# Patient Record
Sex: FEMALE | Race: WHITE | NOT HISPANIC OR LATINO
[De-identification: names, ages, dates, MRNs, and addresses within clinical notes are randomized per-mention and may not be internally consistent; named-entity substitution may affect disease eponyms.]

---

## 2017-06-07 ENCOUNTER — APPOINTMENT (OUTPATIENT)
Dept: GASTROENTEROLOGY | Facility: CLINIC | Age: 62
End: 2017-06-07

## 2017-06-07 VITALS
TEMPERATURE: 98.2 F | SYSTOLIC BLOOD PRESSURE: 120 MMHG | DIASTOLIC BLOOD PRESSURE: 64 MMHG | HEIGHT: 65 IN | BODY MASS INDEX: 16.66 KG/M2 | WEIGHT: 100 LBS

## 2017-06-07 DIAGNOSIS — Z78.9 OTHER SPECIFIED HEALTH STATUS: ICD-10-CM

## 2017-06-07 PROBLEM — Z00.00 ENCOUNTER FOR PREVENTIVE HEALTH EXAMINATION: Status: ACTIVE | Noted: 2017-06-07

## 2017-06-07 RX ORDER — FUROSEMIDE 20 MG/1
20 TABLET ORAL
Refills: 0 | Status: ACTIVE | COMMUNITY

## 2017-06-07 RX ORDER — LORATADINE 10 MG
17 TABLET,DISINTEGRATING ORAL
Refills: 0 | Status: ACTIVE | COMMUNITY

## 2017-12-20 ENCOUNTER — APPOINTMENT (OUTPATIENT)
Dept: GASTROENTEROLOGY | Facility: CLINIC | Age: 62
End: 2017-12-20
Payer: COMMERCIAL

## 2017-12-20 VITALS
HEIGHT: 65 IN | BODY MASS INDEX: 16.5 KG/M2 | TEMPERATURE: 98.5 F | DIASTOLIC BLOOD PRESSURE: 70 MMHG | WEIGHT: 99 LBS | SYSTOLIC BLOOD PRESSURE: 130 MMHG

## 2017-12-20 DIAGNOSIS — Z82.62 FAMILY HISTORY OF OSTEOPOROSIS: ICD-10-CM

## 2017-12-20 DIAGNOSIS — Z82.49 FAMILY HISTORY OF ISCHEMIC HEART DISEASE AND OTHER DISEASES OF THE CIRCULATORY SYSTEM: ICD-10-CM

## 2017-12-20 PROCEDURE — 99213 OFFICE O/P EST LOW 20 MIN: CPT

## 2018-01-31 ENCOUNTER — CHART COPY (OUTPATIENT)
Age: 63
End: 2018-01-31

## 2018-02-07 ENCOUNTER — APPOINTMENT (OUTPATIENT)
Dept: GASTROENTEROLOGY | Facility: CLINIC | Age: 63
End: 2018-02-07
Payer: COMMERCIAL

## 2018-02-07 VITALS
TEMPERATURE: 98.1 F | DIASTOLIC BLOOD PRESSURE: 80 MMHG | BODY MASS INDEX: 16.33 KG/M2 | SYSTOLIC BLOOD PRESSURE: 122 MMHG | WEIGHT: 98 LBS | HEIGHT: 65 IN

## 2018-02-07 DIAGNOSIS — K62.5 HEMORRHAGE OF ANUS AND RECTUM: ICD-10-CM

## 2018-02-07 PROCEDURE — 99214 OFFICE O/P EST MOD 30 MIN: CPT

## 2018-02-07 RX ORDER — OLOPATADINE HCL 1 MG/ML
0.1 SOLUTION/ DROPS OPHTHALMIC
Qty: 5 | Refills: 0 | Status: ACTIVE | COMMUNITY
Start: 2017-07-26

## 2018-04-23 ENCOUNTER — APPOINTMENT (OUTPATIENT)
Dept: GASTROENTEROLOGY | Facility: CLINIC | Age: 63
End: 2018-04-23

## 2018-05-02 ENCOUNTER — APPOINTMENT (OUTPATIENT)
Dept: GASTROENTEROLOGY | Facility: CLINIC | Age: 63
End: 2018-05-02
Payer: COMMERCIAL

## 2018-05-02 VITALS
DIASTOLIC BLOOD PRESSURE: 80 MMHG | OXYGEN SATURATION: 98 % | HEIGHT: 65 IN | SYSTOLIC BLOOD PRESSURE: 120 MMHG | BODY MASS INDEX: 16.33 KG/M2 | TEMPERATURE: 97.7 F | WEIGHT: 98 LBS | HEART RATE: 68 BPM

## 2018-05-02 DIAGNOSIS — Z83.3 FAMILY HISTORY OF DIABETES MELLITUS: ICD-10-CM

## 2018-05-02 DIAGNOSIS — Z82.49 FAMILY HISTORY OF ISCHEMIC HEART DISEASE AND OTHER DISEASES OF THE CIRCULATORY SYSTEM: ICD-10-CM

## 2018-05-02 PROCEDURE — 99213 OFFICE O/P EST LOW 20 MIN: CPT

## 2018-05-02 RX ORDER — MELOXICAM 7.5 MG/1
7.5 TABLET ORAL
Qty: 60 | Refills: 0 | Status: ACTIVE | COMMUNITY
Start: 2017-11-30

## 2018-10-10 ENCOUNTER — APPOINTMENT (OUTPATIENT)
Dept: GASTROENTEROLOGY | Facility: CLINIC | Age: 63
End: 2018-10-10
Payer: COMMERCIAL

## 2018-10-10 VITALS
WEIGHT: 97 LBS | BODY MASS INDEX: 16.16 KG/M2 | SYSTOLIC BLOOD PRESSURE: 134 MMHG | TEMPERATURE: 98.2 F | HEART RATE: 72 BPM | HEIGHT: 65 IN | OXYGEN SATURATION: 98 % | DIASTOLIC BLOOD PRESSURE: 76 MMHG

## 2018-10-10 PROCEDURE — 99213 OFFICE O/P EST LOW 20 MIN: CPT

## 2018-10-10 RX ORDER — METHYLPREDNISOLONE 4 MG/1
4 TABLET ORAL
Qty: 21 | Refills: 0 | Status: DISCONTINUED | COMMUNITY
Start: 2018-05-14

## 2018-10-10 RX ORDER — CYCLOBENZAPRINE HYDROCHLORIDE 10 MG/1
10 TABLET, FILM COATED ORAL
Qty: 30 | Refills: 0 | Status: DISCONTINUED | COMMUNITY
Start: 2017-11-30 | End: 2018-10-10

## 2018-10-10 RX ORDER — LIDOCAINE 5% 700 MG/1
5 PATCH TOPICAL
Qty: 30 | Refills: 0 | Status: ACTIVE | COMMUNITY
Start: 2018-06-07

## 2019-02-20 ENCOUNTER — APPOINTMENT (OUTPATIENT)
Dept: GASTROENTEROLOGY | Facility: CLINIC | Age: 64
End: 2019-02-20
Payer: MEDICARE

## 2019-02-20 VITALS
DIASTOLIC BLOOD PRESSURE: 80 MMHG | WEIGHT: 102 LBS | OXYGEN SATURATION: 98 % | TEMPERATURE: 98.1 F | HEIGHT: 65 IN | HEART RATE: 74 BPM | BODY MASS INDEX: 17 KG/M2 | SYSTOLIC BLOOD PRESSURE: 140 MMHG

## 2019-02-20 DIAGNOSIS — E53.8 DEFICIENCY OF OTHER SPECIFIED B GROUP VITAMINS: ICD-10-CM

## 2019-02-20 PROCEDURE — 99213 OFFICE O/P EST LOW 20 MIN: CPT

## 2019-02-20 RX ORDER — CLOBETASOL PROPIONATE 0.5 MG/G
0.05 OINTMENT TOPICAL
Qty: 15 | Refills: 0 | Status: DISCONTINUED | COMMUNITY
Start: 2018-07-03 | End: 2019-02-20

## 2019-02-20 RX ORDER — CYCLOBENZAPRINE HYDROCHLORIDE 5 MG/1
5 TABLET, FILM COATED ORAL
Qty: 60 | Refills: 0 | Status: DISCONTINUED | COMMUNITY
Start: 2018-06-07 | End: 2019-02-20

## 2019-02-20 NOTE — PHYSICAL EXAM
[General Appearance - Alert] : alert [General Appearance - In No Acute Distress] : in no acute distress [Sclera] : the sclera and conjunctiva were normal [Neck Appearance] : the appearance of the neck was normal [Auscultation Breath Sounds / Voice Sounds] : lungs were clear to auscultation bilaterally [Heart Rate And Rhythm] : heart rate was normal and rhythm regular [Heart Sounds] : normal S1 and S2 [Murmurs] : no murmurs [Abdominal Aorta] : the abdominal aorta was normal [Edema] : there was no peripheral edema [Bowel Sounds] : normal bowel sounds [Abdomen Soft] : soft [Abdomen Tenderness] : non-tender [] : no hepato-splenomegaly [Abdomen Mass (___ Cm)] : no abdominal mass palpated [No Rectal Mass] : no rectal mass [FreeTextEntry1] : Small external hemorrhoids [Abnormal Walk] : normal gait [Oriented To Time, Place, And Person] : oriented to person, place, and time [Impaired Insight] : insight and judgment were intact

## 2019-02-20 NOTE — REASON FOR VISIT
[Follow-Up: _____] : a [unfilled] follow-up visit [Spouse] : spouse [FreeTextEntry1] : Irritable bowel syndrome

## 2019-02-20 NOTE — HISTORY OF PRESENT ILLNESS
[FreeTextEntry1] : Patient continue office for evaluation of irritable bowel syndrome. She states that she has been doing well using MiraLax 3-4 times daily for constipation and Levsin sublingual p.r.n. She was recently diagnosed with folic acid deficiency. She has been started on folic acid 1 mg daily. The patient has been checked for celiac and passed.\par \par The patient otherwise feels well her weight has been stable there is no nausea no vomiting and she uses ranitidine 1-2 times daily as needed.

## 2019-02-20 NOTE — REVIEW OF SYSTEMS
[Fever] : no fever [Chills] : no chills [Abdominal Pain] : no abdominal pain [Vomiting] : no vomiting [Constipation] : constipation [Heartburn] : heartburn [Negative] : Neurological [FreeTextEntry2] : /75 both arms

## 2019-02-20 NOTE — ASSESSMENT
[FreeTextEntry1] : Clinically the patient is doing well at the present time. She was diagnosed with folic acid deficiency. In view of this will reevaluate patient for celiac disease. The meantime patient was advised to continue ranitidine p.r.n., cortisone cream p.r.n. for hemorrhoids and perianal discomfort, and Levsin p.r.n.\par \par Return 6 months

## 2019-02-21 LAB — IGA SER QL IEP: 203 MG/DL

## 2019-02-23 LAB
ENDOMYSIUM IGA SER QL: NEGATIVE
ENDOMYSIUM IGA TITR SER: NORMAL
GLIADIN IGA SER QL: 48 UNITS
GLIADIN IGG SER QL: <5 UNITS
GLIADIN PEPTIDE IGA SER-ACNC: POSITIVE
GLIADIN PEPTIDE IGG SER-ACNC: NEGATIVE
TTG IGA SER IA-ACNC: <1.2 U/ML
TTG IGA SER-ACNC: NEGATIVE
TTG IGG SER IA-ACNC: 1.4 U/ML
TTG IGG SER IA-ACNC: NEGATIVE

## 2019-03-19 ENCOUNTER — APPOINTMENT (OUTPATIENT)
Dept: GASTROENTEROLOGY | Facility: AMBULATORY MEDICAL SERVICES | Age: 64
End: 2019-03-19
Payer: MEDICARE

## 2019-03-19 PROCEDURE — 43239 EGD BIOPSY SINGLE/MULTIPLE: CPT

## 2019-09-25 ENCOUNTER — APPOINTMENT (OUTPATIENT)
Dept: GASTROENTEROLOGY | Facility: CLINIC | Age: 64
End: 2019-09-25
Payer: MEDICARE

## 2019-09-25 VITALS
HEART RATE: 81 BPM | SYSTOLIC BLOOD PRESSURE: 160 MMHG | HEIGHT: 64 IN | BODY MASS INDEX: 16.56 KG/M2 | TEMPERATURE: 97.5 F | OXYGEN SATURATION: 98 % | DIASTOLIC BLOOD PRESSURE: 70 MMHG | WEIGHT: 97 LBS

## 2019-09-25 DIAGNOSIS — R12 HEARTBURN: ICD-10-CM

## 2019-09-25 DIAGNOSIS — Z86.79 PERSONAL HISTORY OF OTHER DISEASES OF THE CIRCULATORY SYSTEM: ICD-10-CM

## 2019-09-25 DIAGNOSIS — Z87.19 PERSONAL HISTORY OF OTHER DISEASES OF THE DIGESTIVE SYSTEM: ICD-10-CM

## 2019-09-25 DIAGNOSIS — K58.9 IRRITABLE BOWEL SYNDROME W/OUT DIARRHEA: ICD-10-CM

## 2019-09-25 PROCEDURE — 99214 OFFICE O/P EST MOD 30 MIN: CPT

## 2019-09-25 RX ORDER — HYDROCORTISONE 25 MG/G
2.5 CREAM TOPICAL TWICE DAILY
Qty: 30 | Refills: 1 | Status: DISCONTINUED | COMMUNITY
Start: 2017-12-20 | End: 2019-09-25

## 2019-09-25 RX ORDER — HYOSCYAMINE SULFATE 0.12 MG/1
0.12 TABLET SUBLINGUAL 3 TIMES DAILY
Qty: 270 | Refills: 1 | Status: DISCONTINUED | COMMUNITY
Start: 2018-10-10 | End: 2019-09-25

## 2019-09-25 RX ORDER — SODIUM FLUORIDE AND POTASSIUM NITRATE 5.8; 57.5 MG/ML; MG/ML
1.1-5 GEL, DENTIFRICE DENTAL
Qty: 100 | Refills: 0 | Status: DISCONTINUED | COMMUNITY
Start: 2018-02-20 | End: 2019-09-25

## 2019-09-25 RX ORDER — HYDROCORTISONE 25 MG/G
2.5 CREAM TOPICAL DAILY
Qty: 30 | Refills: 3 | Status: DISCONTINUED | COMMUNITY
Start: 2017-06-07 | End: 2019-09-25

## 2019-09-25 RX ORDER — HYOSCYAMINE SULFATE 0.12 MG/1
0.12 TABLET SUBLINGUAL 3 TIMES DAILY
Qty: 270 | Refills: 3 | Status: DISCONTINUED | COMMUNITY
Start: 2017-12-20 | End: 2019-09-25

## 2019-09-25 RX ORDER — HYDROCORTISONE 2.5% 25 MG/G
2.5 CREAM TOPICAL DAILY
Refills: 0 | Status: DISCONTINUED | COMMUNITY
End: 2019-09-25

## 2019-09-25 RX ORDER — HYDROCORTISONE 2.5% 25 MG/G
2.5 CREAM TOPICAL
Qty: 30 | Refills: 0 | Status: DISCONTINUED | COMMUNITY
Start: 2018-05-02 | End: 2019-09-25

## 2019-09-25 RX ORDER — DICLOFENAC SODIUM 10 MG/G
1 GEL TOPICAL
Qty: 100 | Refills: 0 | Status: DISCONTINUED | COMMUNITY
Start: 2017-12-07 | End: 2019-09-25

## 2019-09-25 RX ORDER — RANITIDINE 150 MG/1
150 TABLET ORAL
Qty: 180 | Refills: 2 | Status: DISCONTINUED | COMMUNITY
Start: 2018-05-02 | End: 2019-09-25

## 2019-09-25 RX ORDER — FUROSEMIDE 40 MG/1
40 TABLET ORAL
Qty: 30 | Refills: 0 | Status: DISCONTINUED | COMMUNITY
Start: 2018-06-21 | End: 2019-09-25

## 2019-09-25 RX ORDER — RANITIDINE 150 MG/1
150 TABLET ORAL
Qty: 180 | Refills: 3 | Status: DISCONTINUED | COMMUNITY
Start: 2017-12-20 | End: 2019-09-25

## 2019-09-25 RX ORDER — HYOSCYAMINE SULFATE 0.12 MG/1
0.12 TABLET SUBLINGUAL 3 TIMES DAILY
Qty: 270 | Refills: 3 | Status: DISCONTINUED | COMMUNITY
Start: 2018-05-02 | End: 2019-09-25

## 2019-09-25 RX ORDER — RANITIDINE 150 MG/1
150 TABLET ORAL
Qty: 180 | Refills: 1 | Status: DISCONTINUED | COMMUNITY
Start: 2018-10-10 | End: 2019-09-25

## 2019-09-25 RX ORDER — GABAPENTIN 100 MG/1
100 CAPSULE ORAL
Qty: 240 | Refills: 0 | Status: DISCONTINUED | COMMUNITY
Start: 2017-11-30 | End: 2019-09-25

## 2019-09-25 RX ORDER — RANITIDINE 150 MG/1
150 TABLET ORAL
Refills: 0 | Status: DISCONTINUED | COMMUNITY
End: 2019-09-25

## 2019-09-25 RX ORDER — HYOSCYAMINE SULFATE 0.12 MG/1
0.12 TABLET, ORALLY DISINTEGRATING ORAL
Refills: 0 | Status: DISCONTINUED | COMMUNITY
End: 2019-09-25

## 2019-09-25 RX ORDER — HYOSCYAMINE SULFATE 0.12 MG/1
0.12 TABLET SUBLINGUAL 4 TIMES DAILY
Qty: 100 | Refills: 3 | Status: DISCONTINUED | COMMUNITY
Start: 2017-06-07 | End: 2019-09-25

## 2019-09-25 RX ORDER — HYDROCORTISONE 2.5% 25 MG/G
2.5 CREAM TOPICAL DAILY
Qty: 1 | Refills: 0 | Status: DISCONTINUED | COMMUNITY
Start: 2018-10-10 | End: 2019-09-25

## 2019-09-25 RX ORDER — RANITIDINE 150 MG/1
150 TABLET ORAL
Qty: 180 | Refills: 3 | Status: DISCONTINUED | COMMUNITY
Start: 2017-06-07 | End: 2019-09-25

## 2019-09-25 RX ORDER — HYOSCYAMINE SULFATE 0.12 MG/1
0.12 TABLET, ORALLY DISINTEGRATING ORAL 3 TIMES DAILY
Qty: 180 | Refills: 2 | Status: DISCONTINUED | COMMUNITY
Start: 2019-02-20 | End: 2019-09-25

## 2019-09-25 NOTE — ASSESSMENT
[FreeTextEntry1] : Patient with IBS for which she takes Levsin approximately every 2 months.  She has constipation that is mostly controlled with MiraLAX 3 times a week.  She has a history of colonic polyps and a squamous papilloma of the esophagus.  The patient gets occasional heartburn which is relieved by ranitidine.\par \par Patient will continue her current treatment.  Given the recent problems with ranitidine, this was changed to famotidine 40 mg which she can take 1-2 times a day.\par \par Patient will return to see me in 6 months.  She is due for colonoscopy in March 2020 and we will perform an EGD at the same time to follow-up the papilloma.

## 2019-09-25 NOTE — HISTORY OF PRESENT ILLNESS
[FreeTextEntry1] : The patient was a patient of Dr. Webber, who has retired.  The patient is now seeing me to transfer care.\par \par The patient has IBS and a history of colonic polyps.  She had an EGD on March 19, 2019 at which time a squamous papilloma was removed from the esophagus.  The patient gets occasional heartburn for which she takes ranitidine 150 mg a day and occasional Tums.  She denies dysphasia.  She does have occasional epigastric pain.  She takes MiraLAX 3 times a week and tends to move her bowels once every other day.  She denies melena or bright red blood per rectum.  The patient's weight is stable.  She notes that she gets feelings of fecal urgency approximately every 2 months which is relieved by Levsin.  The patient has not been hospitalized in the past year and denies any cardiac issues.

## 2019-09-25 NOTE — CONSULT LETTER
[FreeTextEntry1] : Dear Dr. Alondra Wilburn,\par \par I had the pleasure of seeing your patient ARNOLDO LARA in the office today.  My office note is attached.\par \par Thank you very much for allowing me to participate in the care of your patient.\par \par Sincerely,\par \par Cristiano Quesada M.D., FAC, FACP\par Director, Celiac Program at [facility]\par  of Medicine\par [place] [facility] [employer] at [facility] Practice Director,\par [facility] - Gastroenterology/Internal Medicine at [place]\[facility] [address]\[place] [address]\par Tel: [phone]\par Email: [email or web address]\par \par \par The attached note has been created using a voice recognition system (Dragon).  There may be some misspellings and typos.  Please call my office if you have any issues or questions.

## 2019-09-25 NOTE — PHYSICAL EXAM
[General Appearance - Alert] : alert [General Appearance - In No Acute Distress] : in no acute distress [Neck Appearance] : the appearance of the neck was normal [Neck Cervical Mass (___cm)] : no neck mass was observed [Jugular Venous Distention Increased] : there was no jugular-venous distention [Thyroid Diffuse Enlargement] : the thyroid was not enlarged [Thyroid Nodule] : there were no palpable thyroid nodules [Auscultation Breath Sounds / Voice Sounds] : lungs were clear to auscultation bilaterally [Heart Rate And Rhythm] : heart rate was normal and rhythm regular [Heart Sounds] : normal S1 and S2 [Heart Sounds Gallop] : no gallops [Murmurs] : no murmurs [Heart Sounds Pericardial Friction Rub] : no pericardial rub [Edema] : there was no peripheral edema [Bowel Sounds] : normal bowel sounds [Abdomen Tenderness] : non-tender [Abdomen Soft] : soft [] : no hepato-splenomegaly [Abdomen Mass (___ Cm)] : no abdominal mass palpated [No CVA Tenderness] : no ~M costovertebral angle tenderness [No Spinal Tenderness] : no spinal tenderness [Oriented To Time, Place, And Person] : oriented to person, place, and time [Impaired Insight] : insight and judgment were intact [Affect] : the affect was normal

## 2020-02-25 ENCOUNTER — APPOINTMENT (OUTPATIENT)
Dept: GASTROENTEROLOGY | Facility: CLINIC | Age: 65
End: 2020-02-25
Payer: MEDICARE

## 2020-02-25 VITALS
TEMPERATURE: 98.5 F | HEART RATE: 94 BPM | OXYGEN SATURATION: 98 % | SYSTOLIC BLOOD PRESSURE: 122 MMHG | WEIGHT: 98 LBS | HEIGHT: 64 IN | DIASTOLIC BLOOD PRESSURE: 80 MMHG | BODY MASS INDEX: 16.73 KG/M2

## 2020-02-25 DIAGNOSIS — D36.9 BENIGN NEOPLASM, UNSPECIFIED SITE: ICD-10-CM

## 2020-02-25 PROCEDURE — 99213 OFFICE O/P EST LOW 20 MIN: CPT

## 2020-02-25 RX ORDER — RANITIDINE 150 MG/1
150 TABLET ORAL
Qty: 180 | Refills: 1 | Status: DISCONTINUED | COMMUNITY
Start: 2019-02-20 | End: 2020-02-25

## 2020-02-25 NOTE — PHYSICAL EXAM
[General Appearance - Alert] : alert [General Appearance - In No Acute Distress] : in no acute distress [Neck Appearance] : the appearance of the neck was normal [Jugular Venous Distention Increased] : there was no jugular-venous distention [Thyroid Diffuse Enlargement] : the thyroid was not enlarged [Neck Cervical Mass (___cm)] : no neck mass was observed [Thyroid Nodule] : there were no palpable thyroid nodules [Auscultation Breath Sounds / Voice Sounds] : lungs were clear to auscultation bilaterally [Heart Rate And Rhythm] : heart rate was normal and rhythm regular [Heart Sounds Gallop] : no gallops [Heart Sounds] : normal S1 and S2 [Heart Sounds Pericardial Friction Rub] : no pericardial rub [Murmurs] : no murmurs [Edema] : there was no peripheral edema [Abdomen Soft] : soft [Bowel Sounds] : normal bowel sounds [Abdomen Tenderness] : non-tender [] : no hepato-splenomegaly [No CVA Tenderness] : no ~M costovertebral angle tenderness [Abdomen Mass (___ Cm)] : no abdominal mass palpated [No Spinal Tenderness] : no spinal tenderness [Impaired Insight] : insight and judgment were intact [Oriented To Time, Place, And Person] : oriented to person, place, and time [Affect] : the affect was normal

## 2020-02-27 NOTE — CONSULT LETTER
[FreeTextEntry1] : Dear Dr. Alondra Wilburn,\[facility] \[facility] I had the pleasure of seeing your patient ARNOLDO LARA in the office today.  My office note is attached. PLEASE READ THE "ASSESSMENT" SECTION OF THE NOTE TO SEE MY IMPRESSION AND PLAN.\par \[facility] Thank you very much for allowing me to participate in the care of your patient.\[facility] \[facility] Sincerely,\[facility] \[facility] Cristiano Quesada M.D., FAC, [facility] Director, Celiac Program at [facility]  of Medicine\[facility] and [employer] at [facility] Practice Director,\[facility] - Gastroenterology/Internal Medicine at [address]\[place], [place] [address] Tel: [phone]\[facility] Email: [email or web address] \[facility] \[facility] The attached note has been created using a voice recognition system (Dragon).  There may be some misspellings and typos.  Please call my office if you have any issues or questions.

## 2020-02-27 NOTE — HISTORY OF PRESENT ILLNESS
[FreeTextEntry1] : The patient has a history of IBS with constipation, reflux, history of colonic polyps, and a history of a squamous papilloma of the esophagus.  She gets occasional attacks of abdominal pain the last one 6 to 8 weeks ago.  She uses Levsin as needed.  She uses MiraLAX 3-4 times a week and has a bowel movement every other day.  She denies melena but does see bright red blood on the toilet paper on occasion.  The patient is on Pepcid daily and takes Tums about once a week for occasional heartburn.  She denies dysphasia.  The patient's weight is stable.  The patient has not been admitted to the hospital in the past year and denies any cardiac issues.\par \par \par Note from 9/25/2019 - The patient was a patient of Dr. Webber, who has retired.  The patient is now seeing me to transfer care.\par \par The patient has IBS and a history of colonic polyps.  She had an EGD on March 19, 2019 at which time a squamous papilloma was removed from the esophagus.  The patient gets occasional heartburn for which she takes ranitidine 150 mg a day and occasional Tums.  She denies dysphasia.  She does have occasional epigastric pain.  She takes MiraLAX 3 times a week and tends to move her bowels once every other day.  She denies melena or bright red blood per rectum.  The patient's weight is stable.  She notes that she gets feelings of fecal urgency approximately every 2 months which is relieved by Levsin.  The patient has not been hospitalized in the past year and denies any cardiac issues.

## 2020-02-27 NOTE — REASON FOR VISIT
[FreeTextEntry1] : IBS, history of colonic polyps, reflux, history of squamous papilloma of the esophagus

## 2020-04-02 ENCOUNTER — APPOINTMENT (OUTPATIENT)
Dept: GASTROENTEROLOGY | Facility: AMBULATORY MEDICAL SERVICES | Age: 65
End: 2020-04-02

## 2021-10-20 ENCOUNTER — APPOINTMENT (OUTPATIENT)
Dept: GASTROENTEROLOGY | Facility: CLINIC | Age: 66
End: 2021-10-20
Payer: MEDICARE

## 2021-10-20 VITALS
DIASTOLIC BLOOD PRESSURE: 80 MMHG | BODY MASS INDEX: 16.3 KG/M2 | OXYGEN SATURATION: 98 % | TEMPERATURE: 96.9 F | WEIGHT: 95.5 LBS | HEART RATE: 81 BPM | SYSTOLIC BLOOD PRESSURE: 120 MMHG | HEIGHT: 64 IN

## 2021-10-20 PROCEDURE — 36415 COLL VENOUS BLD VENIPUNCTURE: CPT

## 2021-10-20 PROCEDURE — 99214 OFFICE O/P EST MOD 30 MIN: CPT | Mod: 25

## 2021-10-20 RX ORDER — SODIUM SULFATE, POTASSIUM SULFATE, MAGNESIUM SULFATE 17.5; 3.13; 1.6 G/ML; G/ML; G/ML
17.5-3.13-1.6 SOLUTION, CONCENTRATE ORAL
Qty: 1 | Refills: 0 | Status: ACTIVE | COMMUNITY
Start: 2020-02-25 | End: 1900-01-01

## 2021-10-20 NOTE — HISTORY OF PRESENT ILLNESS
[FreeTextEntry1] : The patient has IBS and reflux as well as a history of adenomatous colonic polyps.  She suffers from both constipation and diarrhea.  She is primarily constipated and uses MiraLAX 3 times a week.  With that, she still needs to use milk of magnesia at times and moves her bowels every 3 days or even less often.  She then will have attacks of abdominal cramping in the upper abdomen along with bouts of diarrhea.  These have increased in frequency and occur approximately every 2 weeks.  They usually last a few hours and are improved with the use of hyoscyamine but the last attack lasted a week.  The patient also takes famotidine 40 mg 1-2 times a day.  With that, she still gets heartburn 2-3 times a week.  She denies dysphagia and denies melena or bright red blood per rectum.  Her weight has been around 100 pounds for sometimes although she has had some mild weight loss recently.  The patient has not been admitted to the hospital in the past year and denies any cardiac issues.  The patient did not have her previously recommended EGD and colonoscopy due to the pandemic.\par \par \par Note from 2/25/2020 - The patient has a history of IBS with constipation, reflux, history of colonic polyps, and a history of a squamous papilloma of the esophagus.  She gets occasional attacks of abdominal pain the last one 6 to 8 weeks ago.  She uses Levsin as needed.  She uses MiraLAX 3-4 times a week and has a bowel movement every other day.  She denies melena but does see bright red blood on the toilet paper on occasion.  The patient is on Pepcid daily and takes Tums about once a week for occasional heartburn.  She denies dysphasia.  The patient's weight is stable.  The patient has not been admitted to the hospital in the past year and denies any cardiac issues.\par \par \par Note from 9/25/2019 - The patient was a patient of Dr. Webber, who has retired.  The patient is now seeing me to transfer care.\par \par The patient has IBS and a history of colonic polyps.  She had an EGD on March 19, 2019 at which time a squamous papilloma was removed from the esophagus.  The patient gets occasional heartburn for which she takes ranitidine 150 mg a day and occasional Tums.  She denies dysphasia.  She does have occasional epigastric pain.  She takes MiraLAX 3 times a week and tends to move her bowels once every other day.  She denies melena or bright red blood per rectum.  The patient's weight is stable.  She notes that she gets feelings of fecal urgency approximately every 2 months which is relieved by Levsin.  The patient has not been hospitalized in the past year and denies any cardiac issues.

## 2021-10-20 NOTE — ASSESSMENT
[FreeTextEntry1] : Patient with IBS, history of adenomatous colonic polyps, and reflux.  She has primary constipation but has bouts of abdominal cramping and diarrhea.\par \par Patient was advised to take MiraLAX daily in order to move her bowels more regularly and prevent attacks.  She will continue to use famotidine 1-2 times a day and hyoscyamine as necessary.\par \par Blood work was sent for celiac labs and genetic testing as well as TSH.\par \par An EGD and colonoscopy have been scheduled. The risks, benefits, alternatives, and limitations of the procedures, including the possibility of missed lesions, were explained.\par \par \par Plan from 2/25/2020 - Patient with IBS and constipation, reflux, history of colonic polyps, and a history of squamous papilloma of the esophagus.  She is doing well on Levsin as needed, MiraLAX 3-4 times a week, and Pepcid daily.\par \par An EGD and colonoscopy have been scheduled. The risks, benefits, alternatives, and limitations of the procedures, including the possibility of missed lesions, were explained.\par \par Patient will continue her current medications.\par \par \par Plan from 9/25/2019 - Patient with IBS for which she takes Levsin approximately every 2 months.  She has constipation that is mostly controlled with MiraLAX 3 times a week.  She has a history of colonic polyps and a squamous papilloma of the esophagus.  The patient gets occasional heartburn which is relieved by ranitidine.\par \par Patient will continue her current treatment.  Given the recent problems with ranitidine, this was changed to famotidine 40 mg which she can take 1-2 times a day.\par \par Patient will return to see me in 6 months.  She is due for colonoscopy in March 2020 and we will perform an EGD at the same time to follow-up the papilloma.

## 2021-10-20 NOTE — REASON FOR VISIT
[Spouse] : spouse [FreeTextEntry1] : IBS, history of adenomatous colonic polyps, abdominal cramping, irregular bowel habits, reflux

## 2021-10-26 LAB
ENDOMYSIUM IGA SER QL: NEGATIVE
ENDOMYSIUM IGA TITR SER: NORMAL
GLIADIN IGA SER QL: 18.8 UNITS
GLIADIN IGG SER QL: <5 UNITS
GLIADIN PEPTIDE IGA SER-ACNC: NEGATIVE
GLIADIN PEPTIDE IGG SER-ACNC: NEGATIVE
IGA SER QL IEP: 226 MG/DL
PROMETHEUS CELIAC GENETICS: NORMAL
TSH SERPL-ACNC: 2.01 UIU/ML
TTG IGA SER IA-ACNC: <1.2 U/ML
TTG IGA SER-ACNC: NEGATIVE
TTG IGG SER IA-ACNC: 1.2 U/ML
TTG IGG SER IA-ACNC: NEGATIVE

## 2021-10-27 ENCOUNTER — NON-APPOINTMENT (OUTPATIENT)
Age: 66
End: 2021-10-27

## 2021-11-16 DIAGNOSIS — Z01.818 ENCOUNTER FOR OTHER PREPROCEDURAL EXAMINATION: ICD-10-CM

## 2021-11-16 DIAGNOSIS — Z20.822 CONTACT WITH AND (SUSPECTED) EXPOSURE TO COVID-19: ICD-10-CM

## 2021-11-17 ENCOUNTER — APPOINTMENT (OUTPATIENT)
Dept: DISASTER EMERGENCY | Facility: CLINIC | Age: 66
End: 2021-11-17

## 2021-11-18 LAB — SARS-COV-2 N GENE NPH QL NAA+PROBE: NOT DETECTED

## 2021-11-22 ENCOUNTER — APPOINTMENT (OUTPATIENT)
Dept: GASTROENTEROLOGY | Facility: AMBULATORY MEDICAL SERVICES | Age: 66
End: 2021-11-22
Payer: MEDICARE

## 2021-11-22 PROCEDURE — 45380 COLONOSCOPY AND BIOPSY: CPT | Mod: 59

## 2021-11-22 PROCEDURE — 45385 COLONOSCOPY W/LESION REMOVAL: CPT | Mod: PT

## 2021-11-22 PROCEDURE — 43239 EGD BIOPSY SINGLE/MULTIPLE: CPT

## 2021-11-23 ENCOUNTER — NON-APPOINTMENT (OUTPATIENT)
Age: 66
End: 2021-11-23

## 2021-12-07 ENCOUNTER — APPOINTMENT (OUTPATIENT)
Dept: GASTROENTEROLOGY | Facility: CLINIC | Age: 66
End: 2021-12-07
Payer: MEDICARE

## 2021-12-07 DIAGNOSIS — K31.7 POLYP OF STOMACH AND DUODENUM: ICD-10-CM

## 2021-12-07 DIAGNOSIS — D12.6 BENIGN NEOPLASM OF COLON, UNSPECIFIED: ICD-10-CM

## 2021-12-07 DIAGNOSIS — K57.30 DIVERTICULOSIS OF LARGE INTESTINE W/OUT PERFORATION OR ABSCESS W/OUT BLEEDING: ICD-10-CM

## 2021-12-07 DIAGNOSIS — K44.9 DIAPHRAGMATIC HERNIA W/OUT OBSTRUCTION OR GANGRENE: ICD-10-CM

## 2021-12-07 DIAGNOSIS — K64.9 UNSPECIFIED HEMORRHOIDS: ICD-10-CM

## 2021-12-07 DIAGNOSIS — K64.8 OTHER HEMORRHOIDS: ICD-10-CM

## 2021-12-07 DIAGNOSIS — K64.4 RESIDUAL HEMORRHOIDAL SKIN TAGS: ICD-10-CM

## 2021-12-07 DIAGNOSIS — K21.9 GASTRO-ESOPHAGEAL REFLUX DISEASE W/OUT ESOPHAGITIS: ICD-10-CM

## 2021-12-07 PROCEDURE — 99213 OFFICE O/P EST LOW 20 MIN: CPT | Mod: 95

## 2021-12-08 PROBLEM — K44.9 HIATAL HERNIA: Status: ACTIVE | Noted: 2021-12-08

## 2021-12-08 NOTE — REASON FOR VISIT
[FreeTextEntry1] : Hiatal hernia, gastric polyps, adenomatous colonic polyps, diverticulosis, hemorrhoids, IBS, reflux

## 2021-12-08 NOTE — HISTORY OF PRESENT ILLNESS
[Home] : at home, [unfilled] , at the time of the visit. [Medical Office: ([facility])___] : at the medical office located in  [Verbal consent obtained from patient] : the patient, [unfilled] [FreeTextEntry1] : We reviewed the evaluations done since the patient's last visit on October 20, 2021.  Blood work revealed negative celiac markers, negative celiac genetic testing, and normal thyroid test.  She underwent EGD and colonoscopy on November 22, 2021.  EGD was significant for a 1 cm hiatal hernia along with benign fundic gland polyps in the gastric body.  Colonoscopy was significant for removal of 2 tubular adenomatous polyps from the cecum along with moderate sigmoid diverticulosis.  She has internal and external hemorrhoids which sometimes cause pain and burning but is relieved by use of Proctozone 2.5% cream.  The patient takes famotidine 1-2 times a day for her reflux and notes that her heartburn is under control with this treatment.  She is on MiraLAX, although not daily, and is moving her bowels every few days.  She has had infrequent need for hyoscyamine as she has not been suffering from her IBS.\par \par \par Note from 10/20/2021 - The patient has IBS and reflux as well as a history of adenomatous colonic polyps.  She suffers from both constipation and diarrhea.  She is primarily constipated and uses MiraLAX 3 times a week.  With that, she still needs to use milk of magnesia at times and moves her bowels every 3 days or even less often.  She then will have attacks of abdominal cramping in the upper abdomen along with bouts of diarrhea.  These have increased in frequency and occur approximately every 2 weeks.  They usually last a few hours and are improved with the use of hyoscyamine but the last attack lasted a week.  The patient also takes famotidine 40 mg 1-2 times a day.  With that, she still gets heartburn 2-3 times a week.  She denies dysphagia and denies melena or bright red blood per rectum.  Her weight has been around 100 pounds for sometimes although she has had some mild weight loss recently.  The patient has not been admitted to the hospital in the past year and denies any cardiac issues.  The patient did not have her previously recommended EGD and colonoscopy due to the pandemic.\par \par \par Note from 2/25/2020 - The patient has a history of IBS with constipation, reflux, history of colonic polyps, and a history of a squamous papilloma of the esophagus.  She gets occasional attacks of abdominal pain the last one 6 to 8 weeks ago.  She uses Levsin as needed.  She uses MiraLAX 3-4 times a week and has a bowel movement every other day.  She denies melena but does see bright red blood on the toilet paper on occasion.  The patient is on Pepcid daily and takes Tums about once a week for occasional heartburn.  She denies dysphasia.  The patient's weight is stable.  The patient has not been admitted to the hospital in the past year and denies any cardiac issues.\par \par \par Note from 9/25/2019 - The patient was a patient of Dr. Webber, who has retired.  The patient is now seeing me to transfer care.\par \par The patient has IBS and a history of colonic polyps.  She had an EGD on March 19, 2019 at which time a squamous papilloma was removed from the esophagus.  The patient gets occasional heartburn for which she takes ranitidine 150 mg a day and occasional Tums.  She denies dysphasia.  She does have occasional epigastric pain.  She takes MiraLAX 3 times a week and tends to move her bowels once every other day.  She denies melena or bright red blood per rectum.  The patient's weight is stable.  She notes that she gets feelings of fecal urgency approximately every 2 months which is relieved by Levsin.  The patient has not been hospitalized in the past year and denies any cardiac issues.

## 2021-12-08 NOTE — CONSULT LETTER
[FreeTextEntry1] : Dear Dr. Alondra Wilburn,\[facility] \[facility] I had the pleasure of seeing your patient ARNOLDO LARA in the office today.  My office note is attached. PLEASE READ THE "ASSESSMENT" SECTION OF THE NOTE TO SEE MY IMPRESSION AND PLAN.\par \[facility] Thank you very much for allowing me to participate in the care of your patient.\[facility] \[facility] Sincerely,\[facility] \[facility] Cristiano Quesada M.D., FAC, [facility] Director, Celiac Program at [facility]  of Medicine\[facility] and [employer] [facility] [employer] at [facility] Practice Director,\[facility] - Gastroenterology/Internal Medicine at [address]\[place], [place] [address] Tel: [phone]\[facility] Email: [email or web address] \[facility] \[facility] The attached note has been created using a voice recognition system (Dragon).  There may be some misspellings and typos.  Please call my office if you have any issues or questions.

## 2021-12-08 NOTE — ASSESSMENT
[FreeTextEntry1] : Patient with a 1 cm hiatal hernia with reflux controlled with famotidine 1-2 times a day.  She has adenomatous colonic polyps and diverticulosis.  She has constipation for which she takes MiraLAX.\par \par Patient was advised to continue famotidine 1-2 times a day.\par \par Patient was advised to use the MiraLAX daily to keep her bowel movements moving better.\par \par Patient was also counseled regarding a high-fiber diet.\par \par We will repeat a colonoscopy in 3 years that is November 2024.\par \par \par Plan from 10/20/2021 - Patient with IBS, history of adenomatous colonic polyps, and reflux.  She has primary constipation but has bouts of abdominal cramping and diarrhea.\par \par Patient was advised to take MiraLAX daily in order to move her bowels more regularly and prevent attacks.  She will continue to use famotidine 1-2 times a day and hyoscyamine as necessary.\par \par Blood work was sent for celiac labs and genetic testing as well as TSH.\par \par An EGD and colonoscopy have been scheduled. The risks, benefits, alternatives, and limitations of the procedures, including the possibility of missed lesions, were explained.\par \par \par Plan from 2/25/2020 - Patient with IBS and constipation, reflux, history of colonic polyps, and a history of squamous papilloma of the esophagus.  She is doing well on Levsin as needed, MiraLAX 3-4 times a week, and Pepcid daily.\par \par An EGD and colonoscopy have been scheduled. The risks, benefits, alternatives, and limitations of the procedures, including the possibility of missed lesions, were explained.\par \par Patient will continue her current medications.\par \par \par Plan from 9/25/2019 - Patient with IBS for which she takes Levsin approximately every 2 months.  She has constipation that is mostly controlled with MiraLAX 3 times a week.  She has a history of colonic polyps and a squamous papilloma of the esophagus.  The patient gets occasional heartburn which is relieved by ranitidine.\par \par Patient will continue her current treatment.  Given the recent problems with ranitidine, this was changed to famotidine 40 mg which she can take 1-2 times a day.\par \par Patient will return to see me in 6 months.  She is due for colonoscopy in March 2020 and we will perform an EGD at the same time to follow-up the papilloma.

## 2022-10-19 ENCOUNTER — LABORATORY RESULT (OUTPATIENT)
Age: 67
End: 2022-10-19

## 2022-10-19 ENCOUNTER — APPOINTMENT (OUTPATIENT)
Dept: GASTROENTEROLOGY | Facility: CLINIC | Age: 67
End: 2022-10-19

## 2022-10-19 VITALS
TEMPERATURE: 97.1 F | SYSTOLIC BLOOD PRESSURE: 120 MMHG | HEIGHT: 64 IN | WEIGHT: 103 LBS | DIASTOLIC BLOOD PRESSURE: 70 MMHG | BODY MASS INDEX: 17.58 KG/M2

## 2022-10-19 DIAGNOSIS — K59.00 CONSTIPATION, UNSPECIFIED: ICD-10-CM

## 2022-10-19 PROCEDURE — 99214 OFFICE O/P EST MOD 30 MIN: CPT

## 2022-10-20 LAB
APPEARANCE: ABNORMAL
BACTERIA UR CULT: NORMAL
BILIRUBIN URINE: NEGATIVE
BLOOD URINE: NEGATIVE
COLOR: YELLOW
GLUCOSE QUALITATIVE U: NEGATIVE
KETONES URINE: NORMAL
LEUKOCYTE ESTERASE URINE: ABNORMAL
NITRITE URINE: NEGATIVE
PH URINE: 6
PROTEIN URINE: NORMAL
SPECIFIC GRAVITY URINE: 1.02
UROBILINOGEN URINE: NORMAL

## 2022-10-21 ENCOUNTER — NON-APPOINTMENT (OUTPATIENT)
Age: 67
End: 2022-10-21

## 2022-10-21 NOTE — ASSESSMENT
[FreeTextEntry1] : Patient with reflux with a 1 cm hiatal hernia who is doing well on famotidine 1-2 times a day.  She has chronic constipation controlled with MiraLAX 4-5 times a week.  She has IBS for which she uses Levsin approximately once a week with good results.  She has a history of adenomatous colonic polyps.\par \par Patient was advised to continue her current medications.\par \par Urine was sent for UA and C&S to see if the patient's urinary tract infection is cleared.\par \par We discussed IBS and various approaches to it including low FODMAPs diet.  We discussed seeing a dietitian but the patient was not interested.  I advised that she keep dietary logs to try to correlate her symptoms with specific food intakes.\par \par Patient is due for colonoscopy in November 2024.\par \par \par Plan from 12/7/2021 - Patient with a 1 cm hiatal hernia with reflux controlled with famotidine 1-2 times a day.  She has adenomatous colonic polyps and diverticulosis.  She has constipation for which she takes MiraLAX.\par \par Patient was advised to continue famotidine 1-2 times a day.\par \par Patient was advised to use the MiraLAX daily to keep her bowel movements moving better.\par \par Patient was also counseled regarding a high-fiber diet.\par \par We will repeat a colonoscopy in 3 years that is November 2024.\par \par \par Plan from 10/20/2021 - Patient with IBS, history of adenomatous colonic polyps, and reflux.  She has primary constipation but has bouts of abdominal cramping and diarrhea.\par \par Patient was advised to take MiraLAX daily in order to move her bowels more regularly and prevent attacks.  She will continue to use famotidine 1-2 times a day and hyoscyamine as necessary.\par \par Blood work was sent for celiac labs and genetic testing as well as TSH.\par \par An EGD and colonoscopy have been scheduled. The risks, benefits, alternatives, and limitations of the procedures, including the possibility of missed lesions, were explained.\par \par \par Plan from 2/25/2020 - Patient with IBS and constipation, reflux, history of colonic polyps, and a history of squamous papilloma of the esophagus.  She is doing well on Levsin as needed, MiraLAX 3-4 times a week, and Pepcid daily.\par \par An EGD and colonoscopy have been scheduled. The risks, benefits, alternatives, and limitations of the procedures, including the possibility of missed lesions, were explained.\par \par Patient will continue her current medications.\par \par \par Plan from 9/25/2019 - Patient with IBS for which she takes Levsin approximately every 2 months.  She has constipation that is mostly controlled with MiraLAX 3 times a week.  She has a history of colonic polyps and a squamous papilloma of the esophagus.  The patient gets occasional heartburn which is relieved by ranitidine.\par \par Patient will continue her current treatment.  Given the recent problems with ranitidine, this was changed to famotidine 40 mg which she can take 1-2 times a day.\par \par Patient will return to see me in 6 months.  She is due for colonoscopy in March 2020 and we will perform an EGD at the same time to follow-up the papilloma.

## 2022-10-21 NOTE — HISTORY OF PRESENT ILLNESS
[FreeTextEntry1] : The patient has a history of reflux with a 1 cm hiatal hernia.  She is on famotidine which she takes daily and sometimes twice a day based on her symptoms.  She reports infrequent heartburn.  She denies dysphagia, nausea, vomiting.  She also has a history of chronic constipation.  She uses MiraLAX 4-5 times a week.  If she takes it daily, she gets diarrhea.  On this regimen, she reports 1 bowel movement a day without melena or bright red blood per rectum.  She has IBS for which she uses Levsin approximately once a week with good results.  She was treated with Macrobid for a UTI and then was treated again with Cipro last week as it was felt that the infection did not clear.  She has a history of adenomatous colonic polyps.  The patient has not been hospitalized in the past year.\par \par \par Note from 12/7/2021 - We reviewed the evaluations done since the patient's last visit on October 20, 2021.  Blood work revealed negative celiac markers, negative celiac genetic testing, and normal thyroid test.  She underwent EGD and colonoscopy on November 22, 2021.  EGD was significant for a 1 cm hiatal hernia along with benign fundic gland polyps in the gastric body.  Colonoscopy was significant for removal of 2 tubular adenomatous polyps from the cecum along with moderate sigmoid diverticulosis.  She has internal and external hemorrhoids which sometimes cause pain and burning but is relieved by use of Proctozone 2.5% cream.  The patient takes famotidine 1-2 times a day for her reflux and notes that her heartburn is under control with this treatment.  She is on MiraLAX, although not daily, and is moving her bowels every few days.  She has had infrequent need for hyoscyamine as she has not been suffering from her IBS.\par \par \par Note from 10/20/2021 - The patient has IBS and reflux as well as a history of adenomatous colonic polyps.  She suffers from both constipation and diarrhea.  She is primarily constipated and uses MiraLAX 3 times a week.  With that, she still needs to use milk of magnesia at times and moves her bowels every 3 days or even less often.  She then will have attacks of abdominal cramping in the upper abdomen along with bouts of diarrhea.  These have increased in frequency and occur approximately every 2 weeks.  They usually last a few hours and are improved with the use of hyoscyamine but the last attack lasted a week.  The patient also takes famotidine 40 mg 1-2 times a day.  With that, she still gets heartburn 2-3 times a week.  She denies dysphagia and denies melena or bright red blood per rectum.  Her weight has been around 100 pounds for sometimes although she has had some mild weight loss recently.  The patient has not been admitted to the hospital in the past year and denies any cardiac issues.  The patient did not have her previously recommended EGD and colonoscopy due to the pandemic.\par \par \par Note from 2/25/2020 - The patient has a history of IBS with constipation, reflux, history of colonic polyps, and a history of a squamous papilloma of the esophagus.  She gets occasional attacks of abdominal pain the last one 6 to 8 weeks ago.  She uses Levsin as needed.  She uses MiraLAX 3-4 times a week and has a bowel movement every other day.  She denies melena but does see bright red blood on the toilet paper on occasion.  The patient is on Pepcid daily and takes Tums about once a week for occasional heartburn.  She denies dysphasia.  The patient's weight is stable.  The patient has not been admitted to the hospital in the past year and denies any cardiac issues.\par \par \par Note from 9/25/2019 - The patient was a patient of Dr. Webber, who has retired.  The patient is now seeing me to transfer care.\par \par The patient has IBS and a history of colonic polyps.  She had an EGD on March 19, 2019 at which time a squamous papilloma was removed from the esophagus.  The patient gets occasional heartburn for which she takes ranitidine 150 mg a day and occasional Tums.  She denies dysphasia.  She does have occasional epigastric pain.  She takes MiraLAX 3 times a week and tends to move her bowels once every other day.  She denies melena or bright red blood per rectum.  The patient's weight is stable.  She notes that she gets feelings of fecal urgency approximately every 2 months which is relieved by Levsin.  The patient has not been hospitalized in the past year and denies any cardiac issues.

## 2022-10-21 NOTE — REASON FOR VISIT
[Spouse] : spouse [FreeTextEntry1] : Reflux, constipation, IBS, history of adenomatous colonic polyps

## 2022-10-21 NOTE — CONSULT LETTER
[FreeTextEntry1] : Dear Dr. Alondra Wilburn,\par \par I had the pleasure of seeing your patient ARNOLDO LARA in the office today.  My office note is attached. PLEASE READ THE "ASSESSMENT" SECTION OF THE NOTE TO SEE MY IMPRESSION AND PLAN.\par \par Thank you very much for allowing me to participate in the care of your patient.\par \par Sincerely,\par \par Cristiano Quesada M.D., FAC, FACP\par Director, Celiac Program at [facility]\par  of Medicine, [employer] at [facility] Adjunct  of Medicine, [facility] Practice Director, [facility] Gastroenterology at [place]\[facility] [address]\[place] [address]\par Tel: [phone]\par Email: [email or web address]\par \par \par The attached note has been created using a voice recognition system (Dragon).  There may be some misspellings and typos.  Please call my office if you have any issues or questions.

## 2023-07-05 ENCOUNTER — APPOINTMENT (OUTPATIENT)
Dept: GASTROENTEROLOGY | Facility: CLINIC | Age: 68
End: 2023-07-05
Payer: MEDICARE

## 2023-07-05 VITALS
DIASTOLIC BLOOD PRESSURE: 70 MMHG | SYSTOLIC BLOOD PRESSURE: 130 MMHG | HEART RATE: 86 BPM | OXYGEN SATURATION: 97 % | WEIGHT: 98 LBS | BODY MASS INDEX: 16.33 KG/M2 | TEMPERATURE: 98 F | HEIGHT: 65 IN

## 2023-07-05 DIAGNOSIS — R10.9 UNSPECIFIED ABDOMINAL PAIN: ICD-10-CM

## 2023-07-05 DIAGNOSIS — R19.4 CHANGE IN BOWEL HABIT: ICD-10-CM

## 2023-07-05 DIAGNOSIS — K21.9 GASTRO-ESOPHAGEAL REFLUX DISEASE W/OUT ESOPHAGITIS: ICD-10-CM

## 2023-07-05 PROCEDURE — 99214 OFFICE O/P EST MOD 30 MIN: CPT

## 2023-07-05 RX ORDER — HYDROCORTISONE 2.5% 25 MG/G
2.5 CREAM TOPICAL
Qty: 1 | Refills: 2 | Status: ACTIVE | COMMUNITY
Start: 2021-10-20 | End: 1900-01-01

## 2023-07-05 RX ORDER — HYDROCORTISONE 2.5% 25 MG/G
2.5 CREAM TOPICAL 3 TIMES DAILY
Qty: 1 | Refills: 2 | Status: DISCONTINUED | COMMUNITY
Start: 2019-02-20 | End: 2023-07-05

## 2023-07-05 RX ORDER — HYOSCYAMINE SULFATE 0.12 MG/1
0.12 TABLET SUBLINGUAL 3 TIMES DAILY
Qty: 270 | Refills: 1 | Status: ACTIVE | COMMUNITY
Start: 2018-05-02 | End: 1900-01-01

## 2023-07-05 RX ORDER — FAMOTIDINE 40 MG/1
40 TABLET, FILM COATED ORAL TWICE DAILY
Qty: 180 | Refills: 2 | Status: ACTIVE | COMMUNITY
Start: 2019-09-25 | End: 1900-01-01

## 2023-07-05 NOTE — HISTORY OF PRESENT ILLNESS
[FreeTextEntry1] : Patient came home from [place] 3 to 4 weeks ago.  Over the past 2 weeks, she has been getting abdominal cramping 3-4 times a day with passage of hard stool, no diarrhea.  She denies melena, bright red blood per rectum, fevers, nausea, vomiting.  She is on famotidine 1-2 times a day for reflux and her symptoms are generally controlled.  She has been off of MiraLAX for the past week.  She tried Levsin which usually helps her but states that this has not helped over the past 2 days.  She has not moved her bowels today.  She has not been on any recent antibiotics and has had no other travel other than to [place].  She has a history of IBS and generally takes MiraLAX 4-5 times a week for constipation.  She also has a history of adenomatous colonic polyps.\par \par \par Note from 10/19/2022 - The patient has a history of reflux with a 1 cm hiatal hernia.  She is on famotidine which she takes daily and sometimes twice a day based on her symptoms.  She reports infrequent heartburn.  She denies dysphagia, nausea, vomiting.  She also has a history of chronic constipation.  She uses MiraLAX 4-5 times a week.  If she takes it daily, she gets diarrhea.  On this regimen, she reports 1 bowel movement a day without melena or bright red blood per rectum.  She has IBS for which she uses Levsin approximately once a week with good results.  She was treated with Macrobid for a UTI and then was treated again with Cipro last week as it was felt that the infection did not clear.  She has a history of adenomatous colonic polyps.  The patient has not been hospitalized in the past year.\par \par \par Note from 12/7/2021 - We reviewed the evaluations done since the patient's last visit on October 20, 2021.  Blood work revealed negative celiac markers, negative celiac genetic testing, and normal thyroid test.  She underwent EGD and colonoscopy on November 22, 2021.  EGD was significant for a 1 cm hiatal hernia along with benign fundic gland polyps in the gastric body.  Colonoscopy was significant for removal of 2 tubular adenomatous polyps from the cecum along with moderate sigmoid diverticulosis.  She has internal and external hemorrhoids which sometimes cause pain and burning but is relieved by use of Proctozone 2.5% cream.  The patient takes famotidine 1-2 times a day for her reflux and notes that her heartburn is under control with this treatment.  She is on MiraLAX, although not daily, and is moving her bowels every few days.  She has had infrequent need for hyoscyamine as she has not been suffering from her IBS.\par \par \par Note from 10/20/2021 - The patient has IBS and reflux as well as a history of adenomatous colonic polyps.  She suffers from both constipation and diarrhea.  She is primarily constipated and uses MiraLAX 3 times a week.  With that, she still needs to use milk of magnesia at times and moves her bowels every 3 days or even less often.  She then will have attacks of abdominal cramping in the upper abdomen along with bouts of diarrhea.  These have increased in frequency and occur approximately every 2 weeks.  They usually last a few hours and are improved with the use of hyoscyamine but the last attack lasted a week.  The patient also takes famotidine 40 mg 1-2 times a day.  With that, she still gets heartburn 2-3 times a week.  She denies dysphagia and denies melena or bright red blood per rectum.  Her weight has been around 100 pounds for sometimes although she has had some mild weight loss recently.  The patient has not been admitted to the hospital in the past year and denies any cardiac issues.  The patient did not have her previously recommended EGD and colonoscopy due to the pandemic.\par \par \par Note from 2/25/2020 - The patient has a history of IBS with constipation, reflux, history of colonic polyps, and a history of a squamous papilloma of the esophagus.  She gets occasional attacks of abdominal pain the last one 6 to 8 weeks ago.  She uses Levsin as needed.  She uses MiraLAX 3-4 times a week and has a bowel movement every other day.  She denies melena but does see bright red blood on the toilet paper on occasion.  The patient is on Pepcid daily and takes Tums about once a week for occasional heartburn.  She denies dysphasia.  The patient's weight is stable.  The patient has not been admitted to the hospital in the past year and denies any cardiac issues.\par \par \par Note from 9/25/2019 - The patient was a patient of Dr. Webber, who has retired.  The patient is now seeing me to transfer care.\par \par The patient has IBS and a history of colonic polyps.  She had an EGD on March 19, 2019 at which time a squamous papilloma was removed from the esophagus.  The patient gets occasional heartburn for which she takes ranitidine 150 mg a day and occasional Tums.  She denies dysphasia.  She does have occasional epigastric pain.  She takes MiraLAX 3 times a week and tends to move her bowels once every other day.  She denies melena or bright red blood per rectum.  The patient's weight is stable.  She notes that she gets feelings of fecal urgency approximately every 2 months which is relieved by Levsin.  The patient has not been hospitalized in the past year and denies any cardiac issues.

## 2023-07-05 NOTE — CONSULT LETTER
[FreeTextEntry1] : Dear Dr. Alondra Wilburn,\par \par I had the pleasure of seeing your patient ARNOLDO LARA in the office today.  My office note is attached. PLEASE READ THE "ASSESSMENT" SECTION OF THE NOTE TO SEE MY IMPRESSION AND PLAN.\par \par Thank you very much for allowing me to participate in the care of your patient.\par \par Sincerely,\par \par Cristiano Quesada M.D., FAC, FACP\par Director, Celiac Program at [facility]\par  of Medicine, [employer] at [facility] Adjunct  of Medicine, [facility] Practice Director, [facility] - Gastroenterology at [place]\[facility] [address]\[place] [address]\par Tel: [phone]\par Email: [email or web address]\par \par \par The attached note has been created using a voice recognition system (Dragon).  There may be some misspellings and typos.  Please call my office if you have any issues or questions.

## 2023-07-05 NOTE — REASON FOR VISIT
[Acute] : an acute visit [Spouse] : spouse [FreeTextEntry1] : Abdominal cramping, IBS, reflux, history of adenomatous colonic polyps, increased frequency of stools

## 2023-07-09 LAB
CDIFF BY PCR: NOT DETECTED
GI PCR PANEL: NOT DETECTED

## 2023-07-10 ENCOUNTER — NON-APPOINTMENT (OUTPATIENT)
Age: 68
End: 2023-07-10

## 2023-11-01 ENCOUNTER — APPOINTMENT (OUTPATIENT)
Dept: GASTROENTEROLOGY | Facility: CLINIC | Age: 68
End: 2023-11-01
Payer: MEDICARE

## 2023-11-01 VITALS
SYSTOLIC BLOOD PRESSURE: 142 MMHG | TEMPERATURE: 97.5 F | BODY MASS INDEX: 15.49 KG/M2 | HEIGHT: 65 IN | WEIGHT: 93 LBS | HEART RATE: 84 BPM | DIASTOLIC BLOOD PRESSURE: 78 MMHG | OXYGEN SATURATION: 98 %

## 2023-11-01 DIAGNOSIS — R19.8 OTHER SPECIFIED SYMPTOMS AND SIGNS INVOLVING THE DIGESTIVE SYSTEM AND ABDOMEN: ICD-10-CM

## 2023-11-01 DIAGNOSIS — R63.4 ABNORMAL WEIGHT LOSS: ICD-10-CM

## 2023-11-01 DIAGNOSIS — K58.9 IRRITABLE BOWEL SYNDROME W/OUT DIARRHEA: ICD-10-CM

## 2023-11-01 DIAGNOSIS — Z86.010 PERSONAL HISTORY OF COLONIC POLYPS: ICD-10-CM

## 2023-11-01 PROCEDURE — 36415 COLL VENOUS BLD VENIPUNCTURE: CPT

## 2023-11-01 PROCEDURE — 99214 OFFICE O/P EST MOD 30 MIN: CPT

## 2023-11-02 LAB
ALBUMIN SERPL ELPH-MCNC: 4.3 G/DL
ALP BLD-CCNC: 76 U/L
ALT SERPL-CCNC: 11 U/L
ANION GAP SERPL CALC-SCNC: 13 MMOL/L
AST SERPL-CCNC: 14 U/L
BASOPHILS # BLD AUTO: 0.06 K/UL
BASOPHILS NFR BLD AUTO: 1.1 %
BILIRUB SERPL-MCNC: 0.4 MG/DL
BUN SERPL-MCNC: 9 MG/DL
CALCIUM SERPL-MCNC: 9.8 MG/DL
CHLORIDE SERPL-SCNC: 104 MMOL/L
CO2 SERPL-SCNC: 25 MMOL/L
CREAT SERPL-MCNC: 0.85 MG/DL
EGFR: 74 ML/MIN/1.73M2
ENDOMYSIUM IGA SER QL: NEGATIVE
ENDOMYSIUM IGA TITR SER: NORMAL
EOSINOPHIL # BLD AUTO: 0.08 K/UL
EOSINOPHIL NFR BLD AUTO: 1.5 %
FERRITIN SERPL-MCNC: 118 NG/ML
FOLATE SERPL-MCNC: 13.2 NG/ML
GGT SERPL-CCNC: 14 U/L
GLIADIN IGA SER QL: 14.5 UNITS
GLIADIN IGG SER QL: <5 UNITS
GLIADIN PEPTIDE IGA SER-ACNC: NEGATIVE
GLIADIN PEPTIDE IGG SER-ACNC: NEGATIVE
GLUCOSE SERPL-MCNC: 98 MG/DL
HCT VFR BLD CALC: 41.5 %
HGB BLD-MCNC: 13.3 G/DL
IGA SER QL IEP: 254 MG/DL
IMM GRANULOCYTES NFR BLD AUTO: 0.4 %
IRON SATN MFR SERPL: 15 %
IRON SERPL-MCNC: 53 UG/DL
LYMPHOCYTES # BLD AUTO: 0.94 K/UL
LYMPHOCYTES NFR BLD AUTO: 17.5 %
MAN DIFF?: NORMAL
MCHC RBC-ENTMCNC: 29.6 PG
MCHC RBC-ENTMCNC: 32 GM/DL
MCV RBC AUTO: 92.2 FL
MONOCYTES # BLD AUTO: 0.42 K/UL
MONOCYTES NFR BLD AUTO: 7.8 %
NEUTROPHILS # BLD AUTO: 3.85 K/UL
NEUTROPHILS NFR BLD AUTO: 71.7 %
PLATELET # BLD AUTO: 318 K/UL
POTASSIUM SERPL-SCNC: 3.8 MMOL/L
PROT SERPL-MCNC: 6.3 G/DL
RBC # BLD: 4.5 M/UL
RBC # FLD: 13.1 %
SODIUM SERPL-SCNC: 142 MMOL/L
TIBC SERPL-MCNC: 345 UG/DL
TSH SERPL-ACNC: 2.27 UIU/ML
TTG IGA SER IA-ACNC: 1.3 U/ML
TTG IGA SER-ACNC: NEGATIVE
UIBC SERPL-MCNC: 292 UG/DL
VIT B12 SERPL-MCNC: 365 PG/ML
WBC # FLD AUTO: 5.37 K/UL

## 2023-12-09 NOTE — ASSESSMENT
[FreeTextEntry1] : Patient with IBS who has been having more cramping and more frequent bowel movements although the stools are hard and incomplete.  She had stopped her MiraLAX 1 week ago.  She has reflux which is controlled with famotidine 1-2 times a day.  She has a history of adenomatous colonic polyps.\par \par This appears to be a worsening of her more chronic IBS, perhaps related to stopping MiraLAX.  The patient was advised to restart MiraLAX as she has been using it in the past.\par \par She was advised that she can use Levsin up to 3 times a day.\par \par Patient was advised to stay on a bland diet.\par \par Stool studies will be sent for a GI infectious PCR panel and C. diff by PCR.\par \par Patient is due for colonoscopy in November 2024.\par \par \par Plan from 10/19/2022 - Patient with reflux with a 1 cm hiatal hernia who is doing well on famotidine 1-2 times a day.  She has chronic constipation controlled with MiraLAX 4-5 times a week.  She has IBS for which she uses Levsin approximately once a week with good results.  She has a history of adenomatous colonic polyps.\par \par Patient was advised to continue her current medications.\par \par Urine was sent for UA and C&S to see if the patient's urinary tract infection is cleared.\par \par We discussed IBS and various approaches to it including low FODMAPs diet.  We discussed seeing a dietitian but the patient was not interested.  I advised that she keep dietary logs to try to correlate her symptoms with specific food intakes.\par \par Patient is due for colonoscopy in November 2024.\par \par \par Plan from 12/7/2021 - Patient with a 1 cm hiatal hernia with reflux controlled with famotidine 1-2 times a day.  She has adenomatous colonic polyps and diverticulosis.  She has constipation for which she takes MiraLAX.\par \par Patient was advised to continue famotidine 1-2 times a day.\par \par Patient was advised to use the MiraLAX daily to keep her bowel movements moving better.\par \par Patient was also counseled regarding a high-fiber diet.\par \par We will repeat a colonoscopy in 3 years that is November 2024.\par \par \par Plan from 10/20/2021 - Patient with IBS, history of adenomatous colonic polyps, and reflux.  She has primary constipation but has bouts of abdominal cramping and diarrhea.\par \par Patient was advised to take MiraLAX daily in order to move her bowels more regularly and prevent attacks.  She will continue to use famotidine 1-2 times a day and hyoscyamine as necessary.\par \par Blood work was sent for celiac labs and genetic testing as well as TSH.\par \par An EGD and colonoscopy have been scheduled. The risks, benefits, alternatives, and limitations of the procedures, including the possibility of missed lesions, were explained.\par \par \par Plan from 2/25/2020 - Patient with IBS and constipation, reflux, history of colonic polyps, and a history of squamous papilloma of the esophagus.  She is doing well on Levsin as needed, MiraLAX 3-4 times a week, and Pepcid daily.\par \par An EGD and colonoscopy have been scheduled. The risks, benefits, alternatives, and limitations of the procedures, including the possibility of missed lesions, were explained.\par \par Patient will continue her current medications.\par \par \par Plan from 9/25/2019 - Patient with IBS for which she takes Levsin approximately every 2 months.  She has constipation that is mostly controlled with MiraLAX 3 times a week.  She has a history of colonic polyps and a squamous papilloma of the esophagus.  The patient gets occasional heartburn which is relieved by ranitidine.\par \par Patient will continue her current treatment.  Given the recent problems with ranitidine, this was changed to famotidine 40 mg which she can take 1-2 times a day.\par \par Patient will return to see me in 6 months.  She is due for colonoscopy in March 2020 and we will perform an EGD at the same time to follow-up the papilloma.
2 seconds or less

## 2024-02-23 ENCOUNTER — NON-APPOINTMENT (OUTPATIENT)
Age: 69
End: 2024-02-23

## 2024-07-13 ENCOUNTER — RX RENEWAL (OUTPATIENT)
Age: 69
End: 2024-07-13

## 2024-07-13 RX ORDER — HYDROCORTISONE 25 MG/G
2.5 CREAM TOPICAL
Qty: 30 | Refills: 2 | Status: ACTIVE | COMMUNITY
Start: 2024-07-13 | End: 1900-01-01

## 2024-08-02 ENCOUNTER — APPOINTMENT (OUTPATIENT)
Dept: GASTROENTEROLOGY | Facility: CLINIC | Age: 69
End: 2024-08-02

## 2025-06-25 ENCOUNTER — NON-APPOINTMENT (OUTPATIENT)
Age: 70
End: 2025-06-25

## 2025-06-26 ENCOUNTER — APPOINTMENT (OUTPATIENT)
Dept: GASTROENTEROLOGY | Facility: CLINIC | Age: 70
End: 2025-06-26
Payer: MEDICARE

## 2025-06-26 VITALS
OXYGEN SATURATION: 98 % | TEMPERATURE: 97.6 F | SYSTOLIC BLOOD PRESSURE: 146 MMHG | BODY MASS INDEX: 15.56 KG/M2 | DIASTOLIC BLOOD PRESSURE: 72 MMHG | HEART RATE: 70 BPM | WEIGHT: 93.5 LBS | RESPIRATION RATE: 18 BRPM

## 2025-06-26 PROBLEM — Z86.0101 HISTORY OF ADENOMATOUS POLYP OF COLON: Status: ACTIVE | Noted: 2019-09-25

## 2025-06-26 PROCEDURE — 99215 OFFICE O/P EST HI 40 MIN: CPT

## 2025-06-26 RX ORDER — SODIUM PICOSULFATE, MAGNESIUM OXIDE, AND ANHYDROUS CITRIC ACID 12; 3.5; 1 G/175ML; G/175ML; MG/175ML
10-3.5-12 MG-GM LIQUID ORAL
Qty: 2 | Refills: 0 | Status: ACTIVE | COMMUNITY
Start: 2025-06-26 | End: 1900-01-01

## 2025-06-26 RX ORDER — POTASSIUM CHLORIDE 1500 MG/1
TABLET, FILM COATED, EXTENDED RELEASE ORAL
Refills: 0 | Status: ACTIVE | COMMUNITY